# Patient Record
Sex: MALE | Race: WHITE
[De-identification: names, ages, dates, MRNs, and addresses within clinical notes are randomized per-mention and may not be internally consistent; named-entity substitution may affect disease eponyms.]

---

## 2021-10-26 ENCOUNTER — HOSPITAL ENCOUNTER (OUTPATIENT)
Dept: HOSPITAL 53 - M PLAIMG | Age: 76
End: 2021-10-26
Attending: PHYSICIAN ASSISTANT
Payer: COMMERCIAL

## 2021-10-26 DIAGNOSIS — M16.11: ICD-10-CM

## 2021-10-26 DIAGNOSIS — R60.0: ICD-10-CM
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
## 2021-10-26 DIAGNOSIS — S72.91XA: Primary | ICD-10-CM
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
## 2021-10-26 NOTE — REP
INDICATION:

R/O FX, PAIN IN R HIP.



COMPARISON:

None



TECHNIQUE:

3T multiplanar MRI imaging of the right hip was obtained using various sequences.



FINDINGS:

There is extensive T1 and T2 prolongation seen throughout the proximal right femur

with evidence of a transverse low signal band through the base of the right femoral

neck.  The base of the signal abnormality is inter and intra trochanteric.  Extensive

T2 hyper signal is seen in the vastus lateralis, obturator externus, adductor brevis,

quadratus femorals, and inferior gemellus muscles.



There are bilateral hip degenerative changes with asymmetric joint space narrowing.

There is no evidence of a joint effusion.  Degenerative changes seen involving the

sacroiliac joints.



IMPRESSION:

There is extensive right proximal femoral marrow edema and a concomitant fracture as

described above.  Due to the complexity of the finding a pathological fracture cannot

be ruled out.  There is extensive surrounding soft tissue edema involving multiple

muscle groups as described above.  Concomitant muscular tear or musculotendinous tear

cannot be ruled out.





<Electronically signed by Tahir Newton > 10/26/21 [id number]

## 2021-12-15 ENCOUNTER — HOSPITAL ENCOUNTER (OUTPATIENT)
Dept: HOSPITAL 53 - M RAD | Age: 76
End: 2021-12-15
Attending: PHYSICIAN ASSISTANT
Payer: COMMERCIAL

## 2021-12-15 DIAGNOSIS — M16.11: Primary | ICD-10-CM

## 2021-12-15 PROCEDURE — 78315 BONE IMAGING 3 PHASE: CPT

## 2021-12-15 NOTE — REP
INDICATION:

UNILATERAL PRIMARY OSTEOARTHRITIS RT HIP.



COMPARISON:

Whole body bone scan of 10/02/2015 and prior right hip MRI of 10/26/2021 which showed

a right hip fracture.



TECHNIQUE/RADIOTRACER AND DOSE:

After the intravenous administration of 22 mCi of technetium 99 M MDP a triple phase

bone scan was obtained.



FINDINGS:

The flow portion of the examination shows no abnormal increased or decreased activity.



Blood pool imaging shows subtle increased activity in the region of the greater

trochanter of the right femur.



Delayed imaging shows further increased activity in the proximal right femur greater

trochanter and inter trochanteric in location much the same way as the edema seen on

the prior MRI.



IMPRESSION:

Findings are consistent with a healing proximal right femoral fracture.  Follow-up

with right hip MRI so it can be compared to the prior right hip MRI of 10/26/2021.

That exam should be performed both before and after the administration of intravenous

gadolinium if a pathological fracture is of clinical concern.





<Electronically signed by Tahir Newton > 12/15/21 [id number]